# Patient Record
Sex: FEMALE | Race: WHITE | Employment: FULL TIME | URBAN - METROPOLITAN AREA
[De-identification: names, ages, dates, MRNs, and addresses within clinical notes are randomized per-mention and may not be internally consistent; named-entity substitution may affect disease eponyms.]

---

## 2024-09-05 ENCOUNTER — OFFICE VISIT (OUTPATIENT)
Dept: URGENT CARE | Facility: URGENT CARE | Age: 24
End: 2024-09-05
Payer: COMMERCIAL

## 2024-09-05 VITALS
HEART RATE: 87 BPM | SYSTOLIC BLOOD PRESSURE: 125 MMHG | WEIGHT: 131 LBS | RESPIRATION RATE: 18 BRPM | TEMPERATURE: 98.5 F | OXYGEN SATURATION: 100 % | DIASTOLIC BLOOD PRESSURE: 84 MMHG

## 2024-09-05 DIAGNOSIS — S80.862A INFECTED INSECT BITE OF LEFT LOWER EXTREMITY, INITIAL ENCOUNTER: ICD-10-CM

## 2024-09-05 DIAGNOSIS — L08.9 INFECTED INSECT BITE OF LEFT LOWER EXTREMITY, INITIAL ENCOUNTER: ICD-10-CM

## 2024-09-05 DIAGNOSIS — A69.20 ERYTHEMA MIGRANS (LYME DISEASE): Primary | ICD-10-CM
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
## 2024-09-05 PROCEDURE — 99203 OFFICE O/P NEW LOW 30 MIN: CPT | Performed by: NURSE PRACTITIONER

## 2024-09-05 RX ORDER — DOXYCYCLINE HYCLATE 100 MG
100 TABLET ORAL 2 TIMES DAILY
Qty: 20 TABLET | Refills: 0 | Status: SHIPPED | OUTPATIENT
Start: 2024-09-05 | End: 2024-09-15

## 2024-09-06 NOTE — PATIENT INSTRUCTIONS
Hydrocortisone twice daily for a week  Zyrtec 10 mg once daily  Cool compresses, try not to scratch

## 2024-09-06 NOTE — PROGRESS NOTES
Assessment & Plan     Erythema migrans (Lyme disease)    - doxycycline hyclate (VIBRA-TABS) 100 MG tablet  Dispense: 20 tablet; Refill: 0    Infected insect bite of left lower extremity, initial encounter       Prescription sent to pharmacy for doxycycline twice daily for 10 days for erythema migrans which will also cover for cellulitis. Erythema is outlined. Keep skin clean and dry. Watch closely for worsening symptoms of infection including fever, chills, redness, swelling, pain, purulent discharge and follow-up right away if develops.     Try not to scratch.   Hydrocortisone twice daily for a week  Zyrtec 10 mg once daily  Cool compresses    Follow-up with PCP if symptoms persist for 5 days, and sooner if symptoms worsen or new symptoms develop.     Discussed red flag symptoms which warrant immediate visit in emergency room    All questions were answered and patient verbalized understanding. AVS reviewed with patient.     Edelmira Viramontes, DNP, APRN, CNP 9/5/2024 7:30 PM  [facility] [place]    Elizabeth Brown is a 24 year old female who presents to clinic today with her mom for the following health issues:  Chief Complaint   Patient presents with    Urgent Care    Derm Problem     Per patient states on Monday she noticed bug bite on back of left thigh states it has a bullseye and redness is spreading          9/5/2024     7:00 PM   Additional Questions   Roomed by KIRIT Rivera   Accompanied by Mother       Patient presents for evaluation of bug bite. She noticed bite on back of left thigh 3 days ago. Associated symptoms: red bullseye that is worsening, itching, warmth. Put anti-itch wipe on which helped temporarily. Denies fever, neck stiffness, headache, joint pain or swelling, fatigue, drainage. No known tick bite.     Problem list, Medication list, Allergies, and Medical history reviewed in EPIC.    ROS:  Review of systems negative except for noted above        Objective    /84    Pulse 87   Temp 98.5  F (36.9  C) (Tympanic)   Resp 18   Wt 59.4 kg (131 lb)   SpO2 100%   Physical Exam  Constitutional:       General: She is not in acute distress.     Appearance: She is not toxic-appearing or diaphoretic.   Musculoskeletal:      Cervical back: Normal range of motion. No rigidity.   Lymphadenopathy:      Cervical: No cervical adenopathy.   Skin:     General: Skin is warm and dry.      Findings: Erythema present.      Comments: 2.5 cm erythema around bite left posterior thigh with 6 cm faint erythema with increased warmth without drainage or abscess   Neurological:      Mental Status: She is alert.      Sensory: No sensory deficit.                  Verbal consent obtained from patient to take photo for medical electronic health record

## 2024-10-06 ENCOUNTER — HEALTH MAINTENANCE LETTER (OUTPATIENT)
Age: 24
End: 2024-10-06